# Patient Record
Sex: FEMALE | Race: WHITE
[De-identification: names, ages, dates, MRNs, and addresses within clinical notes are randomized per-mention and may not be internally consistent; named-entity substitution may affect disease eponyms.]

---
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
## 2018-01-26 ENCOUNTER — HOSPITAL ENCOUNTER (INPATIENT)
Dept: HOSPITAL 82 - OBOP | Age: 18
LOS: 2 days | Discharge: HOME | End: 2018-01-28
Attending: OBSTETRICS & GYNECOLOGY | Admitting: OBSTETRICS & GYNECOLOGY
Payer: COMMERCIAL

## 2018-01-26 VITALS — SYSTOLIC BLOOD PRESSURE: 132 MMHG | DIASTOLIC BLOOD PRESSURE: 67 MMHG

## 2018-01-26 VITALS — DIASTOLIC BLOOD PRESSURE: 71 MMHG | SYSTOLIC BLOOD PRESSURE: 141 MMHG

## 2018-01-26 VITALS — DIASTOLIC BLOOD PRESSURE: 72 MMHG | SYSTOLIC BLOOD PRESSURE: 127 MMHG

## 2018-01-26 VITALS — DIASTOLIC BLOOD PRESSURE: 74 MMHG | SYSTOLIC BLOOD PRESSURE: 125 MMHG

## 2018-01-26 VITALS — SYSTOLIC BLOOD PRESSURE: 131 MMHG | DIASTOLIC BLOOD PRESSURE: 62 MMHG

## 2018-01-26 VITALS — SYSTOLIC BLOOD PRESSURE: 139 MMHG | DIASTOLIC BLOOD PRESSURE: 64 MMHG

## 2018-01-26 VITALS — SYSTOLIC BLOOD PRESSURE: 141 MMHG | DIASTOLIC BLOOD PRESSURE: 75 MMHG

## 2018-01-26 VITALS — BODY MASS INDEX: 32.37 KG/M2 | WEIGHT: 239 LBS | HEIGHT: 72 IN

## 2018-01-26 VITALS — SYSTOLIC BLOOD PRESSURE: 132 MMHG | DIASTOLIC BLOOD PRESSURE: 70 MMHG

## 2018-01-26 VITALS — DIASTOLIC BLOOD PRESSURE: 68 MMHG | SYSTOLIC BLOOD PRESSURE: 138 MMHG

## 2018-01-26 VITALS — DIASTOLIC BLOOD PRESSURE: 75 MMHG | SYSTOLIC BLOOD PRESSURE: 121 MMHG

## 2018-01-26 VITALS — SYSTOLIC BLOOD PRESSURE: 125 MMHG | DIASTOLIC BLOOD PRESSURE: 76 MMHG

## 2018-01-26 VITALS — SYSTOLIC BLOOD PRESSURE: 115 MMHG | DIASTOLIC BLOOD PRESSURE: 77 MMHG

## 2018-01-26 VITALS — DIASTOLIC BLOOD PRESSURE: 67 MMHG | SYSTOLIC BLOOD PRESSURE: 137 MMHG

## 2018-01-26 VITALS — DIASTOLIC BLOOD PRESSURE: 78 MMHG | SYSTOLIC BLOOD PRESSURE: 138 MMHG

## 2018-01-26 VITALS — SYSTOLIC BLOOD PRESSURE: 127 MMHG | DIASTOLIC BLOOD PRESSURE: 72 MMHG

## 2018-01-26 VITALS — SYSTOLIC BLOOD PRESSURE: 136 MMHG | DIASTOLIC BLOOD PRESSURE: 83 MMHG

## 2018-01-26 VITALS — SYSTOLIC BLOOD PRESSURE: 141 MMHG | DIASTOLIC BLOOD PRESSURE: 76 MMHG

## 2018-01-26 VITALS — SYSTOLIC BLOOD PRESSURE: 138 MMHG | DIASTOLIC BLOOD PRESSURE: 78 MMHG

## 2018-01-26 VITALS — SYSTOLIC BLOOD PRESSURE: 121 MMHG | DIASTOLIC BLOOD PRESSURE: 57 MMHG

## 2018-01-26 VITALS — DIASTOLIC BLOOD PRESSURE: 75 MMHG | SYSTOLIC BLOOD PRESSURE: 132 MMHG

## 2018-01-26 VITALS — DIASTOLIC BLOOD PRESSURE: 55 MMHG | SYSTOLIC BLOOD PRESSURE: 126 MMHG

## 2018-01-26 VITALS — DIASTOLIC BLOOD PRESSURE: 75 MMHG | SYSTOLIC BLOOD PRESSURE: 138 MMHG

## 2018-01-26 VITALS — SYSTOLIC BLOOD PRESSURE: 133 MMHG | DIASTOLIC BLOOD PRESSURE: 68 MMHG

## 2018-01-26 VITALS — DIASTOLIC BLOOD PRESSURE: 73 MMHG | SYSTOLIC BLOOD PRESSURE: 134 MMHG

## 2018-01-26 VITALS — SYSTOLIC BLOOD PRESSURE: 138 MMHG | DIASTOLIC BLOOD PRESSURE: 72 MMHG

## 2018-01-26 VITALS — DIASTOLIC BLOOD PRESSURE: 74 MMHG | SYSTOLIC BLOOD PRESSURE: 132 MMHG

## 2018-01-26 VITALS — DIASTOLIC BLOOD PRESSURE: 82 MMHG | SYSTOLIC BLOOD PRESSURE: 132 MMHG

## 2018-01-26 VITALS — DIASTOLIC BLOOD PRESSURE: 73 MMHG | SYSTOLIC BLOOD PRESSURE: 144 MMHG

## 2018-01-26 DIAGNOSIS — Z3A.40: ICD-10-CM

## 2018-01-26 LAB
ALBUMIN SERPL-MCNC: 3.9 G/DL (ref 3.2–5)
ALP SERPL-CCNC: 225 U/L (ref 38–126)
ALT SERPL-CCNC: 24 U/L (ref 9–52)
ANION GAP SERPL CALCULATED.3IONS-SCNC: 17 MMOL/L
AST SERPL-CCNC: 16 U/L (ref 14–36)
BARBITURATES UR-MCNC: NEGATIVE UG/ML
BASOPHILS NFR BLD AUTO: 0 % (ref 0–3)
BILIRUB UR QL STRIP.AUTO: NEGATIVE
BUN SERPL-MCNC: 8 MG/DL (ref 8–21)
BUN/CREAT SERPL: 17
CALCIUM SERPL-MCNC: 9.9 MG/DL (ref 8.4–10.2)
CHLORIDE SERPL-SCNC: 106 MMOL/L (ref 95–108)
CLARITY UR: CLEAR
CO2 SERPL-SCNC: 19 MMOL/L (ref 22–30)
COCAINE UR-MCNC: NEGATIVE NG/ML
COLOR UR AUTO: YELLOW
CREAT SERPL-MCNC: 0.5 MG/DL (ref 0.5–1)
EOSINOPHIL NFR BLD AUTO: 1 % (ref 0–8)
ERYTHROCYTE [DISTWIDTH] IN BLOOD BY AUTOMATED COUNT: 13.4 % (ref 11.5–15.5)
GLUCOSE SERPL-MCNC: 97 MG/DL (ref 70–106)
GLUCOSE UR STRIP.AUTO-MCNC: NEGATIVE MG/DL
HCT VFR BLD AUTO: 36.5 % (ref 37–47)
HGB BLD-MCNC: 12.3 G/DL (ref 12–16)
HGB UR QL STRIP.AUTO: NEGATIVE
IMM GRANULOCYTES NFR BLD: 0.5 % (ref 0–1)
KETONES UR STRIP.AUTO-MCNC: NEGATIVE MG/DL
LEUKOCYTE ESTERASE UR QL STRIP.AUTO: (no result)
LYMPHOCYTES NFR BLD: 17 % (ref 15–41)
MCH RBC QN AUTO: 28.2 PG  CALC (ref 26–32)
MCHC RBC AUTO-ENTMCNC: 33.7 G/L CALC (ref 32–36)
MCV RBC AUTO: 83.7 FL  CALC (ref 80–100)
METHADONE SERPL-MCNC: NEGATIVE NG/ML
MONOCYTES NFR BLD AUTO: 7 % (ref 2–13)
NEUTROPHILS # BLD AUTO: 8.53 THOU/UL (ref 2–7.15)
NEUTROPHILS NFR BLD AUTO: 74 % (ref 42–76)
NITRITE UR QL STRIP.AUTO: NEGATIVE
OXCYCODONE: NEGATIVE
PH UR STRIP.AUTO: 6 [PH] (ref 4.5–8)
PLATELET # BLD AUTO: 235 THOU/UL (ref 130–400)
POTASSIUM SERPL-SCNC: 4 MMOL/L (ref 3.5–5.1)
PROT SERPL-MCNC: 6.8 G/DL (ref 6.3–8.2)
PROT UR QL STRIP.AUTO: NEGATIVE MG/DL
RBC # BLD AUTO: 4.36 MILL/UL (ref 4.2–5.6)
SODIUM SERPL-SCNC: 138 MMOL/L (ref 137–146)
SP GR UR STRIP.AUTO: 1.02
TETRAHYDROCANNABIONOL: NEGATIVE
TRICYLIC ANTIDEPRESSANTS: NEGATIVE
UROBILINOGEN UR QL STRIP.AUTO: 0.2 E.U./DL

## 2018-01-26 PROCEDURE — 0HQ9XZZ REPAIR PERINEUM SKIN, EXTERNAL APPROACH: ICD-10-PCS | Performed by: OBSTETRICS & GYNECOLOGY

## 2018-01-26 NOTE — NUR
[id number]:  WITH MODERATE VARIABILITY , ACCEL PRESENT, NO DECEL, AND
CONTRACTIONS ARE 2-4 MIN. PALPATED ABDOMEN AND CONTRACTIONS ARE MILD.

## 2018-01-26 NOTE — NUR
PT IS STANDING IN THE SIDE OF THE BED MOVING SIDE TO SIDE. PT STATED THAT
PAIN IS 4/10. PT DENIES ANY NEEDS AT THIS TIME. S/O IN ROOM

## 2018-01-26 NOTE — NUR
BOLUS GIVEN FOR DECEL VARIABLE PT IS SITTING IN BIRTHING BALL AND MOVES SIDE
TO SIDE. PT DENIES ANY OTHER NEEDS AT THIS TIME.

## 2018-01-26 NOTE — NUR
COMES TO UNIT WITH QUESTIONABLE SROM AT 1800 ON 18. STATES SHE WAS
LEAKING A LITTLE SO WAITED UNTIL CONTRACTIONS STARTED. EDC 16 WITH
GESTATIONAL AGE OF 40.4 WEEKS. DENIES COMPLICATIONS DURING THIS PREGNANCY.
WILL PERFORM ROM AND FERN TESTS.

## 2018-01-26 NOTE — NUR
WENT IN TO PATIENT'S ROOM TO PERFORM ADMISSION ASSESSMENTS AND IV INITIATION.
EXPLAINED RATIONALE FOR SAME TO PATIENT, WHO WAS RESTING QUIETLY ON LT. SIDE,.
EXPLAINED PROLONGED RUPTURE OF MEMBRANES, RISK OF INFECTION. AND INCREASED
FHR. PATIENT BECAME DEFENSIVE, QUESTIONING IF IT REALLY NEEDED TO BE DONE, AND
WISHING SHE HAD NOT COME IN UNTIL LATER. WAS ADVISED THAT IT WOULD HAVE BEEN
MORE BENFICIAL TO COME IN WHEN RUPTURE OCCURRED AT 1800 LAST PM. PATIENT
BECAME TEARY, STATING " I'M JUST SO TIRED". REFUSED IV START AT THIS TIME.
REQUESTED TO BE ALLOWED TO REST.

## 2018-01-26 NOTE — NUR
0910: PT IS STANDING IN THE SIDE OF THE BED.
0920: PT TO THE BATHROOM TO VOID.
0924: PITOCIN STARTED AT 2MU/MIN AS PER ORDERS. PT VERBALIZED UNDERSTANDING.

## 2018-01-26 NOTE — NUR
0745: DR. TARANGO AT [facility]. MD DISCUSSED PLAN OF CARE WITH PT AND PT
VERBALIZED UNDERSTANDING.
0810: SVE DONE BY MD 2/70/-2.

## 2018-01-26 NOTE — NUR
1650: PT OOB TO VOID.
1710: SVE DONE 8/90/0
1712: CALLED DR. TARANGO RE: PT SVE. MD STATED HE IS COMING.
1715: DIFFCULT TO TRACE FHR  DUE TO FETAL MOVEMENT.  WITH MODERATE
VARIABILITY, ACCEL PRESENT. PT MOVED TO BR#1 VIA WHEELCHAIR ANS S/O AT SIDE.

## 2018-01-26 NOTE — NUR
PT IS RESTING IN BED. PT STATED THAT SHE HAS NO PAIN AT THIS TIME. PALPATED
ABDOMEN AND CONTRACTIONS ARE MILD. PT DENIES ANY NEEDS AT THIS TIME. CALL
LIGHT IN REACH. S/O IN ROOM.

## 2018-01-26 NOTE — NUR
1503: , BUT DIFFICULT TO TRACE FHR DUE TO FETAL MOVEMENT. READJUSTED
EFM BUT UNABLE TO GET FHR DUE TO MOVEMENT. PT IS  IN HER HANDS AND KNEES
POSTION IN  BED.  CONTRACTIONS ARE 2- 3 MIN APART.
1510: JOHAN APPLIED TO TRACE FHR.
1544: UNABLE TO GET FHR TRACE WITH JOHAN AND EFM APPLIED NOW TRACING. PT
TAKES DEEP BREATHS WITH EACH CONTRACTION. PT DENIES ANY NEEDS AT THIS TIME.
S/O IN ROOM.

## 2018-01-26 NOTE — NUR
1721: DR. TARANGO AT BEDSIDE. SVE DONE BY MD AND MD STATED PT IS READY. PT IS
STARTING TO PUSH. NONREBREATHER MASK GIVEN 10L/MIN.
1750: PT TAKES DEEP BREATHS AND THEN PUSHES. , WITH MODERATE
VARIABILITY, ACCEL PRESENT, VARIABLE DECEL, AND CONTRACTIONS ARE 2-3 MIN
APART. EFM READJUSTED TO TRACE FHR.
1752: A MALE INFANT BORN BY DR. TARANGO.
1755: PLACENTA OUT.

## 2018-01-26 NOTE — NUR
READJUSTED EFM DUE TO FETAL MOVEMENT. PT STATED PAIN IS IN LOWER ABDOMEN 6/10.
PT DOES NOT WANT PAIN MEDICATION AT THIS TIME. PT DENIES ANY NEEDS.

## 2018-01-26 NOTE — NUR
SPOKE TO BOTH PARENTS AT LENGTH ABOUT BENEFITS/RISKS OF EYE PROPHYLAXIS AND
VIT K.  MOTHER FEELS THEY ARE UNNECESSARY AND WOULD LIKE TO DECLINE.  GIVEN
INFORMATION ON BOTH.  MOTHER DID HAVE A NEGATIVE GC/CHLAMYDIA CULTURE ON
12/29/17. PARENTS VERBALIZE UNDERSTANDING ABOUT THE RATIONALE FOR THE
RECOMMENDATION FOR ALL NEWBORNS AND ARE AWARE OF THE RESEARCH SUPPORTING
VITAMIN K.  WILL OBTAIN PACKAGE INSERT FOR PARENTS ON VITAMIN K.

## 2018-01-27 VITALS — SYSTOLIC BLOOD PRESSURE: 122 MMHG | DIASTOLIC BLOOD PRESSURE: 57 MMHG

## 2018-01-27 VITALS — DIASTOLIC BLOOD PRESSURE: 58 MMHG | SYSTOLIC BLOOD PRESSURE: 112 MMHG

## 2018-01-27 VITALS — SYSTOLIC BLOOD PRESSURE: 127 MMHG | DIASTOLIC BLOOD PRESSURE: 66 MMHG

## 2018-01-27 VITALS — DIASTOLIC BLOOD PRESSURE: 70 MMHG | SYSTOLIC BLOOD PRESSURE: 118 MMHG

## 2018-01-27 LAB
BASOPHILS NFR BLD AUTO: 0 % (ref 0–3)
EOSINOPHIL NFR BLD AUTO: 1 % (ref 0–8)
ERYTHROCYTE [DISTWIDTH] IN BLOOD BY AUTOMATED COUNT: 13.4 % (ref 11.5–15.5)
HCT VFR BLD AUTO: 32.6 % (ref 37–47)
HGB BLD-MCNC: 11 G/DL (ref 12–16)
IMM GRANULOCYTES NFR BLD: 0.5 % (ref 0–1)
LYMPHOCYTES NFR BLD: 20 % (ref 15–41)
MCH RBC QN AUTO: 28.3 PG  CALC (ref 26–32)
MCHC RBC AUTO-ENTMCNC: 33.7 G/L CALC (ref 32–36)
MCV RBC AUTO: 83.8 FL  CALC (ref 80–100)
MONOCYTES NFR BLD AUTO: 7 % (ref 2–13)
NEUTROPHILS # BLD AUTO: 8.72 THOU/UL (ref 2–7.15)
NEUTROPHILS NFR BLD AUTO: 71 % (ref 42–76)
PLATELET # BLD AUTO: 208 THOU/UL (ref 130–400)
RBC # BLD AUTO: 3.89 MILL/UL (ref 4.2–5.6)

## 2018-01-27 NOTE — NUR
REPORT ON PT. RECEIVED FROM GLENN KAY RN. PT. RELAXING WITH INFANT IN BED AND
FAMILY MEMBERS VISITING AT THIS TIME.PT. DENIES DISCOMFORT.

## 2018-01-28 VITALS — DIASTOLIC BLOOD PRESSURE: 48 MMHG | SYSTOLIC BLOOD PRESSURE: 125 MMHG

## 2018-01-28 NOTE — NUR
PT SITTING UP, HOLDING INFANT, POSITIVE BONDING. VS AND ASSESSMENT DONE,
STABLE. DENIES ANY PAIN. DR. TARANGO IN EARLIER AND DISCHARGED PT. DISCHARGE
TEACHING DONE WITH PT FOR BOTH PT AND INFANT; ALL QUESTIONS ANSWERED, PT
VERBALIZED UNDERSTANDING. PT REFUSED VACCINES. RX FOR MOTRIN GIVEN.

## 2019-03-24 ENCOUNTER — HOSPITAL ENCOUNTER (EMERGENCY)
Dept: HOSPITAL 82 - ED | Age: 19
Discharge: HOME | End: 2019-03-24
Payer: COMMERCIAL

## 2019-03-24 VITALS — BODY MASS INDEX: 35.86 KG/M2 | WEIGHT: 250.51 LBS | HEIGHT: 70 IN

## 2019-03-24 VITALS — SYSTOLIC BLOOD PRESSURE: 138 MMHG | DIASTOLIC BLOOD PRESSURE: 67 MMHG

## 2019-03-24 DIAGNOSIS — Z3A.00: ICD-10-CM

## 2019-03-24 DIAGNOSIS — J02.9: ICD-10-CM

## 2019-03-24 DIAGNOSIS — O99.519: Primary | ICD-10-CM

## 2019-03-24 DIAGNOSIS — J32.9: ICD-10-CM

## 2019-07-26 ENCOUNTER — HOSPITAL ENCOUNTER (EMERGENCY)
Dept: HOSPITAL 82 - ED | Age: 19
Discharge: HOME | End: 2019-07-26
Payer: COMMERCIAL

## 2019-07-26 VITALS — BODY MASS INDEX: 34.4 KG/M2 | WEIGHT: 240.3 LBS | HEIGHT: 70 IN

## 2019-07-26 VITALS — DIASTOLIC BLOOD PRESSURE: 74 MMHG | SYSTOLIC BLOOD PRESSURE: 139 MMHG
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
## 2019-07-26 DIAGNOSIS — S50.311A: Primary | ICD-10-CM

## 2019-07-26 DIAGNOSIS — Z33.1: ICD-10-CM
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
## 2019-07-26 DIAGNOSIS — Z28.21: ICD-10-CM
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
## 2020-11-17 ENCOUNTER — HOSPITAL ENCOUNTER (EMERGENCY)
Dept: HOSPITAL 82 - ED | Age: 20
Discharge: HOME | End: 2020-11-17
Payer: COMMERCIAL

## 2020-11-17 VITALS — DIASTOLIC BLOOD PRESSURE: 61 MMHG | SYSTOLIC BLOOD PRESSURE: 132 MMHG

## 2020-11-17 VITALS — WEIGHT: 220.46 LBS | BODY MASS INDEX: 31.56 KG/M2 | HEIGHT: 70 IN
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
## 2020-11-17 DIAGNOSIS — S93.401A: ICD-10-CM

## 2020-11-17 DIAGNOSIS — S93.402A: Primary | ICD-10-CM
